# Patient Record
Sex: MALE | Race: BLACK OR AFRICAN AMERICAN | NOT HISPANIC OR LATINO | URBAN - NONMETROPOLITAN AREA
[De-identification: names, ages, dates, MRNs, and addresses within clinical notes are randomized per-mention and may not be internally consistent; named-entity substitution may affect disease eponyms.]

---

## 2021-12-02 ENCOUNTER — IMPORTED ENCOUNTER (OUTPATIENT)
Dept: URBAN - NONMETROPOLITAN AREA CLINIC 1 | Facility: CLINIC | Age: 86
End: 2021-12-02

## 2021-12-02 PROBLEM — Z96.1: Noted: 2021-12-02

## 2021-12-02 PROBLEM — H25.11: Noted: 2021-12-02

## 2021-12-02 PROCEDURE — 92004 COMPRE OPH EXAM NEW PT 1/>: CPT

## 2021-12-02 NOTE — PATIENT DISCUSSION
Cataract(s) OD-Visually significant.-Cataract(s) causing symptomatic impairment of visual function not correctable with a tolerable change in glasses or contact lenses lighting or non-operative means resulting in specific activity limitations and/or participation restrictions including but not limited to reading viewing television driving or meeting vocational or recreational needs. -Expectation is clearer vision and reduced glare disability after cataract removal.-Refer to Dr Reina Valencia for cataract evaluationPT TO CALL FOR EVAL WHEN READYs/p PCIOL-Stable PCIOL OS.-Monitor for PCO. -RTC .

## 2022-03-18 PROBLEM — C61 PROSTATE CANCER METASTATIC TO LIVER (HCC): Status: ACTIVE | Noted: 2020-10-10

## 2022-03-18 PROBLEM — C78.7 PROSTATE CANCER METASTATIC TO LIVER (HCC): Status: ACTIVE | Noted: 2020-10-10

## 2022-03-19 PROBLEM — K57.90 DIVERTICULOSIS: Status: ACTIVE | Noted: 2020-10-10

## 2022-03-19 PROBLEM — R55 SYNCOPE: Status: ACTIVE | Noted: 2020-10-10

## 2022-03-19 PROBLEM — K92.2 GI BLEED: Status: ACTIVE | Noted: 2020-10-10

## 2022-04-10 ASSESSMENT — VISUAL ACUITY
OS_CC: J1
OD_CC: J1
OS_CC: 20/50
OD_CC: 20/50

## 2023-05-15 RX ORDER — ALBUTEROL SULFATE 90 UG/1
2 AEROSOL, METERED RESPIRATORY (INHALATION) EVERY 6 HOURS PRN
COMMUNITY

## 2023-05-15 RX ORDER — ACETAMINOPHEN 325 MG/1
650 TABLET ORAL EVERY 4 HOURS PRN
COMMUNITY

## 2023-05-15 RX ORDER — PANTOPRAZOLE SODIUM 40 MG/1
40 TABLET, DELAYED RELEASE ORAL DAILY
COMMUNITY

## 2023-05-15 RX ORDER — FERROUS SULFATE 325(65) MG
325 TABLET ORAL
COMMUNITY
Start: 2020-10-12

## 2023-05-15 RX ORDER — ECHINACEA PURPUREA EXTRACT 125 MG
2 TABLET ORAL PRN
COMMUNITY

## 2023-05-15 RX ORDER — AMLODIPINE BESYLATE 10 MG/1
10 TABLET ORAL DAILY
COMMUNITY

## 2023-05-15 RX ORDER — BISOPROLOL FUMARATE AND HYDROCHLOROTHIAZIDE 10; 6.25 MG/1; MG/1
1 TABLET ORAL DAILY
COMMUNITY